# Patient Record
Sex: FEMALE | Race: OTHER | Employment: OTHER | URBAN - METROPOLITAN AREA
[De-identification: names, ages, dates, MRNs, and addresses within clinical notes are randomized per-mention and may not be internally consistent; named-entity substitution may affect disease eponyms.]

---

## 2017-02-21 ENCOUNTER — HOSPITAL ENCOUNTER (OUTPATIENT)
Dept: PHYSICAL THERAPY | Age: 62
Discharge: HOME | End: 2017-02-21
Payer: MEDICARE

## 2017-02-21 PROCEDURE — 97161 PT EVAL LOW COMPLEX 20 MIN: CPT

## 2017-02-21 PROCEDURE — G8985 CARRY GOAL STATUS: HCPCS

## 2017-02-21 PROCEDURE — G8984 CARRY CURRENT STATUS: HCPCS

## 2017-02-21 PROCEDURE — 97110 THERAPEUTIC EXERCISES: CPT

## 2017-02-21 NOTE — PROGRESS NOTES
PHYSICAL THERAPY - DAILY TREATMENT NOTE    Patient Name: Nadeem Noguera        Date: 2017  : 1955    Patient  Verified: yes  Visit #:   1     Insurance: Payor: BLUE CROSS MEDICARE / Plan: [facility] / Product Type: Managed Care Medicare /      In time: 1000 Out time: 1030   Total Treatment Time: 30     Medicare Time Tracking (below)   Total Timed Codes (min):  10 1:1 Treatment Time:  30     TREATMENT AREA =  Bursitis of right shoulder [M75.51]    SUBJECTIVE  Pain Level (on 0 to 10 scale):  7  / 10   Medication Changes/New allergies or changes in medical history, any new surgeries or procedures?     NO    If yes, update Summary List   Subjective Functional Status/Changes:  []  No changes reported     See EVAL/ POC         OBJECTIVE  Modalities Rationale: to decrease pain, edema, neural compromise in order to perform ADLs/ rest with improved ease        min [] Estim, type/location:                                      []  att     []  unatt     []  w/US     []  w/ice    []  w/heat    min []  Mechanical Traction: type/lbs                   []  pro   []  sup   []  int   []  cont    []  before manual    []  after manual    min []  Ultrasound, settings/location:      min []  Iontophoresis w/ dexamethasone, location:                                               []  take home patch       []  in clinic    min []  Ice     []  Heat    location/position:     min []  Vasopneumatic Device, press/temp:     min []  Other:    [] Skin assessment post-treatment (if applicable):    []  intact    []  redness- no adverse reaction     []redness  adverse reaction:        01 min Therapeutic Exercise:  [x]  See flow sheet   Rationale:      increase ROM and increase strength to improve the patients ability to perform ADLs      min Manual Therapy:    Rationale:      decrease pain, increase ROM, increase tissue extensibility and decrease trigger points to improve patient's ability to perform ADLs     min Neuromuscular Re-ed:    Rationale:    improve coordination, improve balance, increase proprioception and improved posture, improve motor control to improve the patients ability to perform ADLs     min Gait Training:    Rationale:      x min Patient Education:  YES  Reviewed HEP   []  Progressed/Changed HEP based on: Other Objective/Functional Measures:    See EVAL/ POC     Post Treatment Pain Level (on 0 to 10) scale:   <7  / 10     ASSESSMENT      [x]  See POC     PLAN  [x]  Upgrade activities as tolerated  Continue plan of care: yes   []  Discharge due to :    []  Other:      Therapist: Carole Ahumada, PT    Date: 2/21/2017 Time: 10:34 AM     No future appointments.

## 2017-02-21 NOTE — PROGRESS NOTES
[facility] PHYSICAL THERAPY  [employer] [address], [address], [facility], [address] [place] [address] Street - Phone: [phone]  Fax: [phone] / [address] [facility]  Patient Name: Roxanna Noguera : 1955   Medical   Diagnosis: R shoulder bursitis Treatment Diagnosis: Bursitis of right shoulder [M75.51]   Onset Date: 6 month Hx     Referral Source: Jett Berry MD Start of Care [facility]): 2017   Prior Hospitalization: See medical history Provider #: [id number]   Prior Level of Function: Progressive worsening of pain   Comorbidities: arthritis   Medications: Verified on Patient Summary List   The Plan of Care and following information is based on the information from the initial evaluation.   ===================================================================  Assessment / knowles information:  Ms. Dulce Maria Degroot is a 63 yo female who reports 6 month history of right shoulder pain and dysfunction. She does not recall precipitating event, but reports history of pain from arthritis. R shoulder pain is made worse with reaching, carrying, and use. She denies pain at rest.  On assessment, Ms. Noguera sits with forward head and shoulders. Shoulder AROM (R?L): flex= 95/ 144, scap= 107/ 118, ER= 90/90, IR= 70/ 90 degrees. She has limited mobility and pain with combined movements of med rot/ ext, lat rot/ flex. PROM is significantly better than active. She is weak and painful with resistance to right shoulder flex, scaption and ER.   FOTO score= 27%  ===================================================================  Eval Complexity: History LOW Complexity : Zero comorbidities / personal factors that will impact the outcome / POC;  Examination  LOW Complexity : 1-2 Standardized tests and measures addressing body structure, function, activity limitation and / or participation in recreation ; Presentation MEDIUM Complexity : Evolving with changing characteristics ; Decision Making MEDIUM Complexity : FOTO score of 26-74;   Overall Complexity LOW   Problem List: pain affecting function, decrease ROM, decrease strength, decrease ADL/ functional abilitiies, decrease activity tolerance and decrease flexibility/ joint mobility   Treatment Plan may include any combination of the following: Therapeutic exercise, Therapeutic activities, Neuromuscular re-education, Physical agent/modality, Manual therapy and Patient education  Patient / Family readiness to learn indicated by: asking questions, trying to perform skills and interest  Persons(s) to be included in education: patient (P)  Barriers to Learning/Limitations: None  Measures taken:    Patient Goal (s): Decrease pain   Patient self reported health status: good  Rehabilitation Potential: good   Short Term Goals: To be accomplished in  1-2  weeks:  1. Increase R shoulder AROM flex > 110 for improved reaching overhead. 2.Educate patient on early level ROM and stability exercise for HEP; patient to report compliance.  Long Term Goals: To be accomplished in  4-6  weeks:  1. Patient to report >= 60% improvement in symptoms with use of r UE. 2. Increase AROM >= 75% WNL for improved ability to reach/ dress/ groom hair. 3. Patient independent with finalized HEP/ self maintenance. 4. Increase FOTO score >= 60% to indicate improved function with use of R UE  Frequency / Duration:   Patient to be seen  2-3  times per week for 4-6  weeks:  Patient / Caregiver education and instruction: self care, activity modification and exercises  G-Codes (GP): Carry: [id number] Current  CL= 60-79%    Goal  CJ= 20-39%.   The severity rating is based on the FOTO Score      Therapist Signature: Remington Scherer, PT Date: 1/60/6729   Certification Period: 2/21/17 to 5/18/17 Time: 10:35 AM   ===========================================================================================  I certify that the above Physical Therapy Services are being furnished while the patient is under my care. I agree with the treatment plan and certify that this therapy is necessary. Physician Signature:        Date:       Time:     Please sign and return to [facility] or you may fax the signed copy to [phone]. Thank you.

## 2017-02-23 ENCOUNTER — HOSPITAL ENCOUNTER (OUTPATIENT)
Dept: PHYSICAL THERAPY | Age: 62
Discharge: HOME | End: 2017-02-23
Payer: MEDICARE

## 2017-02-23 PROCEDURE — 97140 MANUAL THERAPY 1/> REGIONS: CPT

## 2017-02-23 PROCEDURE — 97110 THERAPEUTIC EXERCISES: CPT

## 2017-02-23 NOTE — PROGRESS NOTES
PHYSICAL THERAPY - DAILY TREATMENT NOTE    Patient Name: Jay Noguera        Date: 2017  : 1955   YES Patient  Verified  Visit #:     Insurance: Payor: BLUE CROSS MEDICARE / Plan: [facility] / Product Type: Managed Care Medicare /      In time: 200 Out time: 245   Total Treatment Time: 45     Medicare Time Tracking (below)   Total Timed Codes (min):  45 1:1 Treatment Time:  45     TREATMENT AREA =  Bursitis of right shoulder [M75.51]    SUBJECTIVE  Pain Level (on 0 to 10 scale):  6  / 10   Medication Changes/New allergies or changes in medical history, any new surgeries or procedures? NO    If yes, update Summary List   Subjective Functional Status/Changes:  []  No changes reported     i was not able to do the exercises because i had the flu for 4 days and was very sick. All the muscle on the back of my shoulder are very painful and sore. OBJECTIVE      30 min Therapeutic Exercise:  [x]  See flow sheet   Rationale:      increase ROM and increase strength to improve the patients ability to perform general ADLs with decrease c/o symptoms. 15 min Manual Therapy: STM posterior (R) shoulder. PROM. Rhythmic stabs. Rationale:      decrease pain, increase ROM and increase tissue extensibility to improve patient's ability to perform general ADLs with decrease c/o symptoms. min Patient Education:  YES  Reviewed HEP   []  Progressed/Changed HEP based on: Other Objective/Functional Measures:    No change in functional measurements today     Post Treatment Pain Level (on 0 to 10) scale:   2  / 10     ASSESSMENT  Assessment/Changes in Function:     Overall good tolerance to all therapeutic interventions for first treatment session    Reports significant improvement of shoulder pain and less tension of her shoulder.      []  See Progress Note/Recertification   Patient will continue to benefit from skilled PT services to modify and progress therapeutic interventions, address ROM deficits, address strength deficits, analyze and address soft tissue restrictions and analyze and cue movement patterns to attain remaining goals. Progress toward goals / Updated goals:    No change toward goals today.      PLAN  []  Upgrade activities as tolerated YES Continue plan of care   []  Discharge due to :    []  Other:      Therapist: DEEPTHI Sheppard    Date: 2/23/2017 Time: 8:46 AM       Future Appointments  Date Time Provider Lena Ashford   2/23/2017 2:00 PM Adriel Rodriguez PTA [facility]

## 2017-03-01 ENCOUNTER — HOSPITAL ENCOUNTER (OUTPATIENT)
Dept: PHYSICAL THERAPY | Age: 62
Discharge: HOME | End: 2017-03-01
Payer: MEDICARE

## 2017-03-01 PROCEDURE — 97140 MANUAL THERAPY 1/> REGIONS: CPT

## 2017-03-01 PROCEDURE — 97110 THERAPEUTIC EXERCISES: CPT

## 2017-03-01 NOTE — PROGRESS NOTES
PHYSICAL THERAPY - DAILY TREATMENT NOTE    Patient Name: Roxanna Noguera        Date: 3/1/2017  : 1955   YES Patient  Verified  Visit #:   3   of   12  Insurance: Payor: BLUE CROSS MEDICARE / Plan: [facility] / Product Type: Managed Care Medicare /      In time: 315 Out time: 350   Total Treatment Time: 35     Medicare Time Tracking (below)   Total Timed Codes (min):  35 1:1 Treatment Time:  25     TREATMENT AREA =  Right shoulder pain [M25.511]  Bursitis of right shoulder [M75.51]    SUBJECTIVE  Pain Level (on 0 to 10 scale):  4  / 10   Medication Changes/New allergies or changes in medical history, any new surgeries or procedures? NO    If yes, update Summary List   Subjective Functional Status/Changes:  []  No changes reported     Reports shoulder gets very sore if she lifts anything. Slight improvement in pain since last visit. OBJECTIVE    25 (15)  min Therapeutic Exercise:  [x]  See flow sheet   Rationale:      increase ROM, increase strength and improve coordination to improve the patients ability to perform pain free ADLs. 10 Min Manual Therapy: (R) shoulder PROM. TPR (R) periscapular mms. Rationale:      decrease pain, increase ROM, increase tissue extensibility and decrease trigger points to improve patient's ability to perform pain free ADLs. min Patient Education:  YES  Reviewed HEP   []  Progressed/Changed HEP based on: Other Objective/Functional Measures: Therex per flow sheet. Post Treatment Pain Level (on 0 to 10) scale:   1  / 10     ASSESSMENT  Assessment/Changes in Function:     TTP along mid delt, infraspinous fossa, post capsule.      []  See Progress Note/Recertification   Patient will continue to benefit from skilled PT services to modify and progress therapeutic interventions, address functional mobility deficits, address ROM deficits, address strength deficits, analyze and address soft tissue restrictions, analyze and cue movement patterns, analyze and modify body mechanics/ergonomics and assess and modify postural abnormalities to attain remaining goals. Progress toward goals / Updated goals:    Decrease in reported pain levels. Progressing toward LTG #1.       PLAN  [x]  Upgrade activities as tolerated YES Continue plan of care   []  Discharge due to :    []  Other:      Therapist: Mercy Granado PTA    Date: 3/1/2017 Time: 5:35 PM     Future Appointments  Date Time Provider Lena Ashford   3/6/2017 4:30 PM Mara Perez, PT [facility]   3/8/2017 3:00 PM Jerod White, PT [facility]   3/14/2017 2:00 PM Jeffrey Ruano, PT [facility]   3/17/2017 2:30 PM Jeffrey Ruano, PT [facility]   3/21/2017 2:30 PM Jeffrey Ruano, PT [facility]   3/23/2017 2:30 PM Jeffrey Ruano, PT [facility]

## 2017-03-06 ENCOUNTER — HOSPITAL ENCOUNTER (OUTPATIENT)
Dept: PHYSICAL THERAPY | Age: 62
Discharge: HOME | End: 2017-03-06
Payer: MEDICARE

## 2017-03-06 PROCEDURE — 97140 MANUAL THERAPY 1/> REGIONS: CPT

## 2017-03-06 PROCEDURE — 97110 THERAPEUTIC EXERCISES: CPT

## 2017-03-06 NOTE — PROGRESS NOTES
PHYSICAL THERAPY - DAILY TREATMENT NOTE    Patient Name: Kelsea Noguera        Date: 3/6/2017  : 1955   YES Patient  Verified  Visit #:     Insurance: Payor: BLUE CROSS MEDICARE / [facility] / Product Type: Managed Care Medicare /      In time: 4:30 Out time: 5:19   Total Treatment Time: 49     Medicare Time Tracking (below)   Total Timed Codes (min):  49 1:1 Treatment Time:  35     TREATMENT AREA =  Right shoulder pain [M25.511]  Bursitis of right shoulder [M75.51]    SUBJECTIVE  Pain Level (on 0 to 10 scale):   10   Medication Changes/New allergies or changes in medical history, any new surgeries or procedures?     NO    If yes, update Summary List   Subjective Functional Status/Changes:  []  No changes reported     Reports max pain 6/10 recently mostly at left CS/upper trap/levator scap          OBJECTIVE    8 Min Manual Therapy: DTM upper trap, lev scap, lat delt; PROM all planes, GH mobs post, inf   Rationale:      decrease pain, increase ROM and increase tissue extensibility to improve patient's ability to lift/reach    41 min Therapeutic Exercise:  [x]  See flow sheet   Rationale:      increase ROM and increase strength to improve the patients ability to lift/reach      min Patient Education:  YES  Reviewed HEP   []  Progressed/Changed HEP based on:   Given UT and LevScap stretches     Other Objective/Functional Measures:                          AROM                                                           MMT   Right Left  Right Left   Flexion 156  Flexion     Extension 44  Extension     ER @ 0 Degrees 50  ER     Scaption 146  Scaption     IR behind back  (C7 to thumb) 26cm 17cm IR     90/90 ER   I     - added several exs for ROM and scap/RTC stabilization       Post Treatment Pain Level (on 0 to 10) scale:   3  / 10     ASSESSMENT  Assessment/Changes in Function:     Improved AROM shld, improve shld pain, most now in CS     []  See Progress Note/Recertification   Patient will continue to benefit from skilled PT services to modify and progress therapeutic interventions, address functional mobility deficits, address ROM deficits, address strength deficits, analyze and address soft tissue restrictions and analyze and cue movement patterns to attain remaining goals.    Progress toward goals / Updated goals:    LTG 2 progressing, improved AROM     PLAN  [x]  Upgrade activities as tolerated YES Continue plan of care   []  Discharge due to :    []  Other:      Therapist: Jr Singh PT, DPT, OCS, CSCS    Date: 3/6/2017 Time: 4:55 PM     Future Appointments  Date Time Provider Lena Ashford   3/8/2017 3:00 PM Asmita Byers PT [address]   3/14/2017 2:00 PM Barbi Palacio PT [address]   3/17/2017 2:30 PM Barbi Palacio PT [address]   3/21/2017 2:30 PM Barbi Palacio PT [address]   3/23/2017 2:30 PM Barbi Palacio PT [address]

## 2017-03-14 ENCOUNTER — APPOINTMENT (OUTPATIENT)
Dept: PHYSICAL THERAPY | Age: 62
End: 2017-03-14
Payer: MEDICARE

## 2017-03-17 ENCOUNTER — APPOINTMENT (OUTPATIENT)
Dept: PHYSICAL THERAPY | Age: 62
End: 2017-03-17
Payer: MEDICARE

## 2017-03-20 ENCOUNTER — HOSPITAL ENCOUNTER (OUTPATIENT)
Dept: PHYSICAL THERAPY | Age: 62
Discharge: HOME | End: 2017-03-20
Payer: MEDICARE

## 2017-03-20 PROCEDURE — G8985 CARRY GOAL STATUS: HCPCS

## 2017-03-20 PROCEDURE — 97140 MANUAL THERAPY 1/> REGIONS: CPT

## 2017-03-20 PROCEDURE — G8984 CARRY CURRENT STATUS: HCPCS

## 2017-03-20 PROCEDURE — 97110 THERAPEUTIC EXERCISES: CPT

## 2017-03-20 NOTE — PROGRESS NOTES
[address] [facility] PHYSICAL THERAPY  [address], [address], [place], [address] - Phone: [phone]  Fax: [phone]  PROGRESS NOTE  Patient Name: Bryan Noguera : 1955   Treatment/Medical Diagnosis: Right shoulder pain [M25.511]  Bursitis of right shoulder [M75.51]   Referral Source: Malina Conrad MD     Date of Initial Visit: 17 Attended Visits: 5 Missed Visits: 1     SUMMARY OF TREATMENT  Pt has attended 5 sessions of PT w/ tx consisting of therex and manual to improve R shoulder ROM, c/s flexibility/mobility, and posture mechanics, and pt education including HEP instruction. CURRENT STATUS  Pt demo's slow progress towards goals but does report subjective improvement in sx, noting inc ease w/ ADLs. Pt reports max pain 8/10 that is worse when laying on R side. Pt does cont to c/o pain/tension in the R cervical/upper trap region. PROM of R shoulder WNL and pain-free in all planes. R shoulder AROM: flex 156, abd 146, ext 44, ER (in neutral)  50, FIR 26 cm from C7. FOTO 56/100, GROC +5 \"a good deal better\"    Goal/Measure of Progress Goal Met? 1.  Pt to report >/= 60% improvement in sx w/ use of R UE   Status at last Eval: 10/10 Current Status: 8/10 progress   2. Inc AROM >/= 75% WNL for improved ability to reach, dress, groom   Status at last Eval: Flex 95, scap 107, ER 90, IR 70 Current Status: Flex 156, scap 146, ext 44, FIR 26 cm from C7 progress   3. Inc FOTO score to >/= 60% to indicate improved function w/ use of R UE   Status at last Eval: 27% Current Status: 56% progress     New Goals to be achieved in __3__  weeks:  1. Achieve LTG #1  2. Pt will demo I w/ long-term HEP and be prepared for DC  3. Achieve LTG #3    RECOMMENDATIONS  Recommend pt continue PT 2x/wk for additional 4 weeks to further improve upon R shoulder AROM and strength/stability to improve pt's ability to complete ADLs.     If you have any questions/comments please contact us directly at [phone]. Thank you for allowing us to assist in the care of your patient. Therapist Signature: Pennie Adkins PT Date: 3/20/2017     Time: 2:23 PM   NOTE TO PHYSICIAN:  PLEASE COMPLETE THE ORDERS BELOW AND FAX TO   [facility]: [phone]  If you are unable to process this request in 24 hours please contact our office: [phone]    ___ I have read the above report and request that my patient continue as recommended.   ___ I have read the above report and request that my patient continue therapy with the following changes/special instructions:_________________________________________________________   ___ I have read the above report and request that my patient be discharged from therapy.      Physician Signature:        Date:       Time:

## 2017-03-20 NOTE — PROGRESS NOTES
PHYSICAL THERAPY - DAILY TREATMENT NOTE    Patient Name: Eloisa Noguera        Date: 3/20/2017  : 1955   yes Patient  Verified  Visit #:      of     Insurance: Payor: BLUE CROSS MEDICARE / [facility] / Product Type: Managed Care Medicare /      In time: 2:28 Out time: 3:10   Total Treatment Time: 42     Medicare Time Tracking (below)   Total Timed Codes (min):  42 1:1 Treatment Time:  39     TREATMENT AREA =  Right shoulder pain [M25.511]  Bursitis of right shoulder [M75.51]    SUBJECTIVE  Pain Level (on 0 to 10 scale):  5  / 10   Medication Changes/New allergies or changes in medical history, any new surgeries or procedures?    no  If yes, update Summary List   Subjective Functional Status/Changes:  []  No changes reported     Pt c/o pain B shoulder over the weekend \"I think I ma over-using my left one\". Pt reports overall improvement in her R shoulder and inc ease w/ doing dishes. Cont to c/o pain w/ laying on the R side, max pain 8/10. OBJECTIVE    34 min Therapeutic Exercise:  [x]  See flow sheet   Rationale:      increase ROM, increase strength and improve coordination to improve the patients ability to lift, reach, carry     8 min Manual Therapy: STM to R lev scap, UT, pec minor; PROM all planes, inf GHJ mob   Rationale:      decrease pain, increase ROM, increase tissue extensibility and decrease trigger points to improve patient's ability to lift, reach     min Patient Education:  yes  Reviewed HEP   []  Progressed/Changed HEP based on:        Other Objective/Functional Measures:    31' 1:1 TE    PROM R shoulder WNL and pain-free all planes  Noted difficulty wand IR AAROM and c/o soreness anterior shoulder  FOTO: 56  GROC: +5     Post Treatment Pain Level (on 0 to 10) scale:   4  / 10     ASSESSMENT  Assessment/Changes in Function:     See PN     []  See Progress Note/Recertification   Patient will continue to benefit from skilled PT services to modify and progress therapeutic interventions, address functional mobility deficits, address ROM deficits, address strength deficits, analyze and address soft tissue restrictions, analyze and cue movement patterns, analyze and modify body mechanics/ergonomics and instruct in home and community integration to attain remaining goals.    Progress toward goals / Updated goals:    See PN     PLAN  []  Upgrade activities as tolerated yes Continue plan of care   []  Discharge due to :    []  Other:      Therapist: Josselin Solares PT    Date: 3/20/2017 Time: 2:20 PM     Future Appointments  Date Time Provider Lena Ashford   3/20/2017 2:30 PM Josselin Solares PT [facility]   3/21/2017 2:30 PM Beryle Dys, PT [facility]   3/23/2017 2:30 PM Beryle Dys, PT [facility]

## 2017-03-21 ENCOUNTER — HOSPITAL ENCOUNTER (OUTPATIENT)
Dept: PHYSICAL THERAPY | Age: 62
Discharge: HOME | End: 2017-03-21
Payer: MEDICARE

## 2017-03-21 PROCEDURE — 97110 THERAPEUTIC EXERCISES: CPT

## 2017-03-21 PROCEDURE — 97140 MANUAL THERAPY 1/> REGIONS: CPT

## 2017-03-21 NOTE — PROGRESS NOTES
PHYSICAL THERAPY - DAILY TREATMENT NOTE    Patient Name: Leonel Noguera        Date: 3/21/2017  : 1955   YES Patient  Verified  Visit #:     Insurance: Payor: BLUE CROSS MEDICARE / [facility] / Product Type: Managed Care Medicare /      In time: 235 Out time: 320   Total Treatment Time: 45     Medicare Time Tracking (below)   Total Timed Codes (min):  45 1:1 Treatment Time:  45     TREATMENT AREA = Right shoulder pain [M25.511]  Bursitis of right shoulder [M75.51]    SUBJECTIVE  Pain Level (on 0 to 10 scale):  4  / 10   Medication Changes/New allergies or changes in medical history, any new surgeries or procedures? NO    If yes, update Summary List   Subjective Functional Status/Changes:  []  No changes reported     Reports soreness in (R) shld pain with combined extension and IR when reaching behind back           OBJECTIVE    35 min Therapeutic Exercise:  [x]  See flow sheet   Rationale:      increase ROM and increase strength to improve the patients ability to perform ADLs     10 Min Manual Therapy: DTM to pec minor, UT/LS, caudal glide of scap, posterior tilt inferior glide of scap, MET for posterior ribs 3-5   Rationale:      decrease pain, increase ROM and increase tissue extensibility to improve patient's ability to perform ADLs     min Patient Education:  YES  Reviewed HEP   []  Progressed/Changed HEP based on:         Other Objective/Functional Measures:    Demonstrates increase in pec minor, UT/LS hypertonicity with pain with combined extension/internal rotation      Post Treatment Pain Level (on 0 to 10) scale:   0  / 10     ASSESSMENT  Assessment/Changes in Function:     Tolerated therx today with ability to perform AAROM of combined IR/ext to T7 at end of treatment session with tactile cues for posture and scap positioning     []  See Progress Note/Recertification   Patient will continue to benefit from skilled PT services to modify and progress therapeutic interventions, address functional mobility deficits, address ROM deficits, address strength deficits and analyze and address soft tissue restrictions to attain remaining goals. Progress toward goals / Updated goals:    No changes towards goals this visit.  Will monitor and progress as able      PLAN  []  Upgrade activities as tolerated YES Continue plan of care   []  Discharge due to :    []  Other:      Therapist: Juan Pablo Toscano DPT, CIMT    Date: 3/21/2017 Time: 3:28 PM       Future Appointments  Date Time Provider Lena Ashford   3/23/2017 2:30 PM Ang Belcher, PT [facility]

## 2017-03-23 ENCOUNTER — APPOINTMENT (OUTPATIENT)
Dept: PHYSICAL THERAPY | Age: 62
End: 2017-03-23
Payer: MEDICARE

## 2017-03-24 ENCOUNTER — HOSPITAL ENCOUNTER (OUTPATIENT)
Dept: PHYSICAL THERAPY | Age: 62
Discharge: HOME | End: 2017-03-24
Payer: MEDICARE

## 2017-03-24 PROCEDURE — 97110 THERAPEUTIC EXERCISES: CPT

## 2017-03-24 PROCEDURE — 97140 MANUAL THERAPY 1/> REGIONS: CPT

## 2017-03-24 NOTE — PROGRESS NOTES
PHYSICAL THERAPY - DAILY TREATMENT NOTE    Patient Name: Panda Noguera        Date: 3/24/2017  : 1955   yes Patient  Verified  Visit #:     Insurance: Payor: BLUE CROSS MEDICARE / [facility] / Product Type: Managed Care Medicare /      In time: 1:24 Out time: 2:02   Total Treatment Time: 38     Medicare Time Tracking (below)   Total Timed Codes (min):  38 1:1 Treatment Time:  38     TREATMENT AREA =  Right shoulder pain [M25.511]  Bursitis of right shoulder [M75.51]    SUBJECTIVE  Pain Level (on 0 to 10 scale):  6  / 10   Medication Changes/New allergies or changes in medical history, any new surgeries or procedures?    no  If yes, update Summary List   Subjective Functional Status/Changes:  []  No changes reported     Pt reports inc pain along the R posterior c/s and shoulder blade. OBJECTIVE  28 min Therapeutic Exercise:  [x]  See flow sheet   Rationale:      increase ROM, increase strength and improve coordination to improve the patients ability to complete reaching tasks, dressing     10 min Manual Therapy: STM/DTM to R rhomboid, infraspinatus, lev scap, UT, c/s paraspinal; scapular depression, post tilt mobl inf, post GHJ mob   Rationale:      decrease pain, increase ROM, increase tissue extensibility and decrease trigger points to improve patient's ability to complete ADLs     min Patient Education:  yes  Reviewed HEP   []  Progressed/Changed HEP based on: Other Objective/Functional Measures:     Added wall ladder flexion to improve AROM R shoulder  Noted soft tissue restrictions R periscapular musculature limiting scap mobility       Post Treatment Pain Level (on 0 to 10) scale:    10     ASSESSMENT  Assessment/Changes in Function:     Pt will cont to benefit from PT to progress PREs in order to improve R shoulder strength/stability for ADLs     []  See Progress Note/Recertification   Patient will continue to benefit from skilled PT services to modify and progress therapeutic interventions, address functional mobility deficits, address ROM deficits, address strength deficits, analyze and address soft tissue restrictions, analyze and cue movement patterns, analyze and modify body mechanics/ergonomics, assess and modify postural abnormalities and instruct in home and community integration to attain remaining goals. Progress toward goals / Updated goals:    Pt progressing to LTG #1     PLAN  []  Upgrade activities as tolerated yes Continue plan of care   []  Discharge due to :    []  Other:      Therapist: Elisabet Rodgers PT    Date: 3/24/2017 Time: 1:47 PM     No future appointments.

## 2017-03-29 ENCOUNTER — HOSPITAL ENCOUNTER (OUTPATIENT)
Dept: PHYSICAL THERAPY | Age: 62
Discharge: HOME | End: 2017-03-29
Payer: MEDICARE

## 2017-03-29 PROCEDURE — 97110 THERAPEUTIC EXERCISES: CPT

## 2017-03-29 PROCEDURE — 97140 MANUAL THERAPY 1/> REGIONS: CPT

## 2017-03-29 NOTE — PROGRESS NOTES
PHYSICAL THERAPY - DAILY TREATMENT NOTE    Patient Name: Shabana Noguera        Date: 3/29/2017  : 1955   yes Patient  Verified  Visit #:     Insurance: Payor: BLUE CROSS MEDICARE / Plan: [facility] / Product Type: Managed Care Medicare /      In time: 3:28 Out time: 4:09   Total Treatment Time: 41     Medicare Time Tracking (below)   Total Timed Codes (min):  41 1:1 Treatment Time:  30     TREATMENT AREA =  Right shoulder pain [M25.511]  Bursitis of right shoulder [M75.51]    SUBJECTIVE  Pain Level (on 0 to 10 scale):  5  / 10   Medication Changes/New allergies or changes in medical history, any new surgeries or procedures?    no  If yes, update Summary List   Subjective Functional Status/Changes:  []  No changes reported     Pt reports inc pain with excessive cooking. Reports she chopped 3 zucchinis into small pieces today and had inc pain R ant shoulder. OBJECTIVE    31 min Therapeutic Exercise:  [x]  See flow sheet   Rationale:      increase ROM and increase strength to improve the patients ability to complete ADLs, reaching     10 min Manual Therapy: STM to R ant delt, pec minor, major, TPR to R UT; RS at 90 deg of flex, post cuff str   Rationale:      decrease pain, increase ROM, increase tissue extensibility and decrease trigger points to improve patient's ability to complete ADLs       min Patient Education:  yes  Reviewed HEP   []  Progressed/Changed HEP based on: Other Objective/Functional Measures:    20' 1:1 TE    Pt c/o pain at end range finger ladder flexion; corrected form due to pt in more abduction vs scaption and pt reported dec pain  Cont w/ soft tissue restrictions to R UT       Post Treatment Pain Level (on 0 to 10) scale:   3  / 10     ASSESSMENT  Assessment/Changes in Function:     Pt reported dec pain post tx session.  Advised pt to refrain from over-activity, specifically with cooking and lifting/carrying to avoid ove use     []  See Progress Note/Recertification   Patient will continue to benefit from skilled PT services to modify and progress therapeutic interventions, address functional mobility deficits, address ROM deficits, address strength deficits, analyze and address soft tissue restrictions, analyze and cue movement patterns, analyze and modify body mechanics/ergonomics, assess and modify postural abnormalities and instruct in home and community integration to attain remaining goals.    Progress toward goals / Updated goals:    No significant progress made towards goals this visit     PLAN  []  Upgrade activities as tolerated yes Continue plan of care   []  Discharge due to :    []  Other:      Therapist: Cathy Ibrahim PT    Date: 3/29/2017 Time: 3:47 PM     Future Appointments  Date Time Provider Lena Ashford   3/30/2017 9:00 AM [facility]

## 2017-03-30 ENCOUNTER — HOSPITAL ENCOUNTER (OUTPATIENT)
Dept: PHYSICAL THERAPY | Age: 62
Discharge: HOME | End: 2017-03-30
Payer: MEDICARE

## 2017-03-30 PROCEDURE — 97110 THERAPEUTIC EXERCISES: CPT

## 2017-03-30 PROCEDURE — 97140 MANUAL THERAPY 1/> REGIONS: CPT

## 2017-03-30 NOTE — PROGRESS NOTES
PHYSICAL THERAPY - DAILY TREATMENT NOTE    Patient Name: Hany Noguera        Date: 3/30/2017  : 1955   YES Patient  Verified  Visit #:     Insurance: Payor: BLUE CROSS MEDICARE / Plan: [facility] / Product Type: Managed Care Medicare /      In time: 900 Out time: 940   Total Treatment Time: 40     Medicare Time Tracking (below)   Total Timed Codes (min):  40 1:1 Treatment Time:  40     TREATMENT AREA = Right shoulder pain [M25.511]  Bursitis of right shoulder [M75.51]    SUBJECTIVE  Pain Level (on 0 to 10 scale):  4  / 10   Medication Changes/New allergies or changes in medical history, any new surgeries or procedures?     NO    If yes, update Summary List   Subjective Functional Status/Changes:  []  No changes reported     Reports cont soreness after therapy session last visit           OBJECTIVE  Modalities Rationale:      to improve patient's ability to    min [] Estim, type/location:                                      []  att     []  unatt     []  w/US     []  w/ice    []  w/heat    min []  Mechanical Traction: type/lbs                   []  pro   []  sup   []  int   []  cont    []  before manual    []  after manual    min []  Ultrasound, settings/location:      min []  Iontophoresis w/ dexamethasone, location:                                               []  take home patch       []  in clinic    min []  Ice     []  Heat    location/position:     min []  Vasopneumatic Device, press/temp:     min []  Other:    [] Skin assessment post-treatment (if applicable):    []  intact    []  redness- no adverse reaction     []redness  adverse reaction:        30 min Therapeutic Exercise:  [x]  See flow sheet   Rationale:      increase ROM and increase strength to improve the patients ability to increase functional use of (R) UE during ADLs     10 min Manual Therapy: DTM to (R) anterior shld region, (R) post cuff, LS, UT, subscap, f/b PROM of (R) shld Rationale:      decrease pain, increase ROM and increase tissue extensibility to improve patient's ability to perform ADLS     min Patient Education:  YES  Reviewed HEP   []  Progressed/Changed HEP based on: Other Objective/Functional Measures:    Demonstrates increase mm hypertonicity in (R) scapular and RTC mm with pain in anterior shld region, con't with poor scapular positioning with anterior tilt and elevated scapula on (R) compared to (L)     Post Treatment Pain Level (on 0 to 10) scale:   2  / 10     ASSESSMENT  Assessment/Changes in Function:     Decrease in pain levels post therx and manual therapy, poor carry over between PT session      []  See Progress Note/Recertification   Patient will continue to benefit from skilled PT services to modify and progress therapeutic interventions, address functional mobility deficits, address ROM deficits, address strength deficits and analyze and address soft tissue restrictions to attain remaining goals.    Progress toward goals / Updated goals:    See MD progress note     PLAN  []  Upgrade activities as tolerated YES Continue plan of care   []  Discharge due to :    []  Other:      Therapist: Eliseo Hemphill DPT, CIMT    Date: 3/30/2017 Time: 9:58 AM       Future Appointments  Date Time Provider Lena Ashford   4/4/2017 9:00 AM James Ba, PT [facility]   4/5/2017 11:00 AM James Ba, PT [facility]   5/3/2017 2:30 PM James Ba, PT [facility]   5/5/2017 2:30 PM Rebbecca Early, PT [facility]   5/9/2017 2:30 PM Rebbecca Early, PT [facility]   5/11/2017 2:30 PM Rebbecca Early, PT [facility]

## 2017-03-30 NOTE — PROGRESS NOTES
Aline [address]  [facility] PHYSICAL THERAPY  [address], [place], [address] - Phone: [phone]  Fax: [phone] OF CARE/RECERTIFICATION FOR PHYSICAL THERAPY          Patient Name: Jeana Noguera : 1955   Medical/Treatment Diagnosis: Right shoulder pain [M25.511]  Bursitis of right shoulder [M75.51]   Onset Date: 6 month hx    Referral Source: Dorothy Snow MD Start of Care [facility]): 17   Prior Hospitalization: See Medical History Provider #: [id number]   Prior Level of Function: Progressive worsening of pain limiting ADL and leisure activities    Comorbidities: OA   Medications: Verified on Patient Summary List   Visits from [facility]: 9 Missed Visits:      Goal/Measure of Progress Goal Met? 1.  Pt to report >/= 60% improvement in sx w/ use of R UE   Status at last Eval: 8/10 Current Status: 2-6/10  progressing   2. Pt will demo I w/ long-term HEP and be prepared for DC   Status at last Eval: unable Current Status: progressing progressing   3. Inc FOTO score to >/= 60% to indicate improved function w/ use of R UE   Status at last Eval: 56 Current Status: na n/a     Key Functional Changes/Progress: Con't with anterior shld pain with ADLS and leisure activities, Recently reports that she experience pain with chopping vegetables.  Demonstrates full PROM of (R) shld region, cont with limited shld AROM and poor scapular positioning for overhead reach and performance of ADLS   Problem List: pain affecting function, decrease ROM, decrease strength, decrease ADL/ functional abilitiies, decrease activity tolerance and decrease flexibility/ joint mobility   Treatment Plan may include any combination of the following: Therapeutic exercise, Therapeutic activities, Neuromuscular re-education, Physical agent/modality, Manual therapy and Patient education  Patient Goal(s) has been updated and includes:      Goals for this certification period include and are to be achieved in   3-4  weeks:  Con't goals above  Frequency / Duration:   Patient to be seen   2-3   times per week for   4    weeks:  G-Codes (GP): Carry [id number] Current  CK= 40-59%    Goal  CJ= 20-39%. The severity rating is based on the FOTO Score    Assessments/Recommendations: Recommend con't PT services 2-3x week for 4 weeks. If you have any questions/comments please contact us directly at [phone]. Thank you for allowing us to assist in the care of your patient. Therapist Signature: Hermelinda Alejandra DPT, CIMT Date: 6/29/5996   Certification Period:  Reporting Period: 2/21/17-5/18/17  3/20/17-3/30/17 Time: 10:01 AM   NOTE TO PHYSICIAN:  PLEASE COMPLETE THE ORDERS BELOW AND FAX TO   [facility]: [phone]  If you are unable to process this request in 24 hours please contact our office: [phone]    ___ I have read the above report and request that my patient continue as recommended.   ___ I have read the above report and request that my patient continue therapy with the following changes/special instructions: ________________________________________________   ___ I have read the above report and request that my patient be discharged from therapy.      Physician Signature:        Date:       Time:

## 2017-04-04 ENCOUNTER — HOSPITAL ENCOUNTER (OUTPATIENT)
Dept: PHYSICAL THERAPY | Age: 62
Discharge: HOME | End: 2017-04-04
Payer: MEDICARE

## 2017-04-04 PROCEDURE — G8985 CARRY GOAL STATUS: HCPCS

## 2017-04-04 PROCEDURE — 97140 MANUAL THERAPY 1/> REGIONS: CPT

## 2017-04-04 PROCEDURE — 97110 THERAPEUTIC EXERCISES: CPT

## 2017-04-04 PROCEDURE — G8986 CARRY D/C STATUS: HCPCS

## 2017-04-04 NOTE — PROGRESS NOTES
PHYSICAL THERAPY - DAILY TREATMENT NOTE    Patient Name: Gillian Noguera        Date: 2017  : 1955   yes Patient  Verified  Visit #:   10   of   12 (+8)  Insurance: Payor: BLUE CROSS MEDICARE / Plan: [facility] / Product Type: Managed Care Medicare /      In time: 8:48 Out time: 9:29   Total Treatment Time: 41     Medicare Time Tracking (below)   Total Timed Codes (min):  41 1:1 Treatment Time:  41     TREATMENT AREA =  Right shoulder pain [M25.511]  Bursitis of right shoulder [M75.51]    SUBJECTIVE  Pain Level (on 0 to 10 scale):  4  / 10   Medication Changes/New allergies or changes in medical history, any new surgeries or procedures?    no  If yes, update Summary List   Subjective Functional Status/Changes:  []  No changes reported     Pt reports she was packing her suitcase on Saturday, having to do a lot of reaching and folding clothes, noticing inc R shoulder and neck soreness on . Reports it is feeling better today. OBJECTIVE    31 min Therapeutic Exercise:  [x]  See flow sheet   Rationale:      increase ROM, increase strength and improve coordination to improve the patients ability to complete ADLs, reaching tasks     10 min Manual Therapy: STM to R ant delt, infraspinatus, rhomboid major, lev scap; PROM L shoulder   Rationale:      decrease pain, increase ROM, increase tissue extensibility and decrease trigger points to improve patient's ability to complete ADLs     min Patient Education:  yes  Reviewed HEP   []  Progressed/Changed HEP based on: Other Objective/Functional Measures:    ttp to R infraspinatus  Inc reps/resistance per flow to improve strength/stability  Achieve AAROM FIR to inf angle of L scap     Post Treatment Pain Level (on 0 to 10) scale:    10     ASSESSMENT  Assessment/Changes in Function:     Pt cont w/ soft tissue restrictions to R post cuff. Denied pain t/o tx session.       []  See Progress Note/Recertification   Patient will continue to benefit from skilled PT services to modify and progress therapeutic interventions, address functional mobility deficits, address ROM deficits, address strength deficits, analyze and address soft tissue restrictions, analyze and cue movement patterns, analyze and modify body mechanics/ergonomics, assess and modify postural abnormalities and instruct in home and community integration to attain remaining goals.    Progress toward goals / Updated goals:    Progressing to  LTG #2, demos good knowledge of exercise     PLAN  []  Upgrade activities as tolerated yes Continue plan of care   []  Discharge due to :    []  Other:      Therapist: Serena Red PT    Date: 4/4/2017 Time: 8:49 AM     Future Appointments  Date Time Provider Lena Ashford   4/4/2017 9:00 AM Serena Red PT [facility]   4/5/2017 11:00 AM Serena Red PT [facility]   5/3/2017 2:30 PM Serena Red PT [facility]   5/5/2017 2:30 PM [address] [place] [address]   5/9/2017 2:30 PM Dipesh Barrientos, PT [facility]   5/11/2017 2:30 PM Dipesh Barrientos, PT [facility]

## 2017-04-05 ENCOUNTER — APPOINTMENT (OUTPATIENT)
Dept: PHYSICAL THERAPY | Age: 62
End: 2017-04-05
Payer: MEDICARE

## 2017-05-05 ENCOUNTER — APPOINTMENT (OUTPATIENT)
Dept: PHYSICAL THERAPY | Age: 62
End: 2017-05-05

## 2017-05-09 ENCOUNTER — APPOINTMENT (OUTPATIENT)
Dept: PHYSICAL THERAPY | Age: 62
End: 2017-05-09

## 2017-05-11 ENCOUNTER — APPOINTMENT (OUTPATIENT)
Dept: PHYSICAL THERAPY | Age: 62
End: 2017-05-11

## 2017-05-16 NOTE — PROGRESS NOTES
[facility] PHYSICAL THERAPY  [address] [place] [address] [address],[place], [address] [facility] - Phone: [phone]  Fax: [phone]  Joseluis Islasestefaniaaiyana Romero          Patient Name: Fabiola Noguera : 1955   Treatment/Medical Diagnosis: Right shoulder pain [M25.511]  Bursitis of right shoulder [M75.51]   Onset Date: 6 months prior to IE    Referral Source: Janessa Cooper MD [facility] of [facility]): 17   Prior Hospitalization: See Medical History Provider #: [id number]   Prior Level of Function: Progressive worsening of pain   Comorbidities: OA   Medications: Verified on Patient Summary List   Visits from [facility]: 10 Missed Visits: 2     Goal/Measure of Progress Goal Met? 1.  Pt to report >/= 60% overall improvement in sx w/ use of R UE   Status at last Eval: 8/10 Current Status: 6/10 progress   2. Pt will demo I w/ long-term HEP in prep for DC   Status at last Eval: HEP established Current Status: Cont to req cueing for exercise, indicating limited carry over of HEP no   3. Inc FOTO score to >/= 60% to indicate improved function w/ use of R UE   Status at last Eval: 27% Current Status: 56% progress     Key Functional Changes/Progress: Pt attended PT 2x/wk for a total of 10 visits. Pt continued to c/o inc R shoulder and neck p! W/ over activity, including reaching and lifting. Pt demo'd soft tissue restriction to  R UT and posterior cuff. R shoulder AROM flex 156, abd 146, ext 44, ER 50, FIR to inf angle of L scapula. PROM R shoulder WNL all planes. Pt's FOTO score improved significantly. Pt went on extended trip out of town and has not been seen in our facility since 17. For this reason, pt has been discharged from our care. Thank you. G-Codes (GP): Carry  [id number] Goal  CJ= 20-39%  [id number] D/C  CK= 40-59%. The severity rating is based on the FOTO Score  Assessments/Recommendations: Discontinue therapy due to lack of attendance or compliance.     If you have any questions/comments please contact us directly at [phone]. Thank you for allowing us to assist in the care of your patient.     Therapist Signature: [address], PT Date: 5/16/17   Reporting Period: 2/21/17 - 4/4/17 Time: 7:05 AM

## 2017-08-01 ENCOUNTER — HOSPITAL ENCOUNTER (OUTPATIENT)
Dept: PHYSICAL THERAPY | Age: 62
Discharge: HOME | End: 2017-08-01
Payer: MEDICARE

## 2017-08-01 PROCEDURE — G8984 CARRY CURRENT STATUS: HCPCS

## 2017-08-01 PROCEDURE — 97162 PT EVAL MOD COMPLEX 30 MIN: CPT

## 2017-08-01 PROCEDURE — 97110 THERAPEUTIC EXERCISES: CPT

## 2017-08-01 PROCEDURE — G8985 CARRY GOAL STATUS: HCPCS

## 2017-08-01 NOTE — PROGRESS NOTES
[address] PHYSICAL THERAPY  [address] [place] [address] [address],[facility], [address] [facility] - Phone: [phone]  Fax: [phone] / [address] [facility]  Patient Name: Brian Noguera : 1955   Treatment   Diagnosis: (R) shld pain, Cervical pain Medical   Diagnosis: Bursitis of right shoulder [M75.51]   Onset Date: chronic     Referral Source: Maria Fernanda Rosado DO Start of Care [facility]): 2017   Prior Hospitalization: See medical history Provider #: [id number]   Prior Level of Function: Limited with positional tolerance, lifting carrying items due to chronic shld pain and cervical pain   Comorbidities: OA   Medications: Verified on Patient Summary List   The Plan of Care and following information is based on the information from the initial evaluation.   ==================================================================================  Assessment / key information:  Pt is a 61 yo female with recurrent episode of (R) shld pain and cervical pain associated with cervical spondylosis and (R) shld bursitis . She presents with pain ranging from 6-9/10, located (R) shld, cervical spine. Pain is made worse with activity, reaching performing ADLs and leisure activities, better with rest, ADLs and leisure activities. C/S AROM: flexion 40 deg,, extension 40 deg, LSB 15 deg, RSB 15 deg, (R) shld flexion 145 deg, ext: 40 deg, ER: 80 deg, IR, L5  Palpation reveals TTP increase hypertonicity to (R) UT/LS, pec minor, infraspinatus, teres minor . Posture increased scapular protraction and elevation (R) > (L) with increase in thoracic kyphosis . Decrease posterior/infeior glide of GH joint, decreased PA glide to T4-T6. Eura Kylee Sensation is intact to light touch. (-) nerve tension tests. Pt will benefit from PT interventions to address the aforementioned deficits and allow pt to return to PLOF.   Eval Complexity: History MEDIUM  Complexity : 1-2 comorbidities / personal factors will impact the outcome/ POC ;  Examination  MEDIUM Complexity : 3 Standardized tests and measures addressing body structure, function, activity limitation and / or participation in recreation ; Presentation MEDIUM Complexity : Evolving with changing characteristics ; Decision Making MEDIUM Complexity : FOTO score of 26-74; Overall Complexity MEDIUM    ==================================================================================  Problem List: pain affecting function, decrease ROM, decrease strength, decrease ADL/ functional abilitiies, decrease activity tolerance, decrease flexibility/ joint mobility and decrease transfer abilities   Treatment Plan may include any combination of the following: Therapeutic exercise, Therapeutic activities, Neuromuscular re-education, Physical agent/modality, Manual therapy and Patient education  Patient / Family readiness to learn indicated by: asking questions, trying to perform skills and interest  Persons(s) to be included in education: patient (P)  Barriers to Learning/Limitations: no  Measures taken:    Patient Goal (s): Decrease pain   Patient self reported health status: good  Rehabilitation Potential: good   Short Term Goals: To be accomplished in  2-3  weeks:  1. Pt will be independent and compliant with HEP to decrease pain, increase ROM and return pt to PLOF. 2. Pt will note pain less than or equal to 5/10 at worst to allow increase in functional abilities. 3. Pt will demonstrates increase in (R) shld AROM flexion to 160 deg to aid in performing ADLs    Long Term Goals: To be accomplished in  4-6  weeks:  1. Increase score on FOTO by > or = 21 points to demo increase in functional activities. 2. Pt will have full, pain-free AROM of the cervical spine in all planes to increase safety with driving, ADLs and self-care. 3. Pt will note < or = 2/10 pain with all mobility to improve comfort with ADLs.    Frequency / Duration:   Patient to be seen  2-3  times per week for 4-6  weeks:  Patient / Caregiver education and instruction: self care, activity modification and exercises  G-Codes (GP): Carry [id number] Current  CK= 40-59%    Goal  CJ= 20-39%. The severity rating is based on the FOTO Score    Therapist Signature: Arvin CORONAT, CIMT Date: 1/3/3301   Certification Period: 8/1/17-10/24/17 Time: 3:54 PM   ===========================================================================================  I certify that the above Physical Therapy Services are being furnished while the patient is under my care. I agree with the treatment plan and certify that this therapy is necessary. Physician Signature:        Date:       Time:     Please sign and return to [facility] or you may fax the signed copy to [phone]. Thank you.

## 2017-08-01 NOTE — PROGRESS NOTES
PHYSICAL THERAPY - DAILY TREATMENT NOTE    Patient Name: Natalya Noguera        Date: 2017  : 1955   YES Patient  Verified  Visit #:     Insurance: Payor: BLUE CROSS MEDICARE / Plan: [facility] / Product Type: Managed Care Medicare /      In time: 330 Out time: 400   Total Treatment Time: 30     Medicare Time Tracking (below)   Total Timed Codes (min):  30 1:1 Treatment Time:  30     TREATMENT AREA =  (R) shld pain, Cervical pain    SUBJECTIVE    Pain Level (on 0 to 10 scale):  ie  / 10   Medication Changes/New allergies or changes in medical history, any new surgeries or procedures? NO    If yes, update Summary List   Subjective Functional Status/Changes:  []  No changes reported     See POC          OBJECTIVE    8 min Patient Education:  YES  Reviewed HEP   []  Progressed/Changed HEP based on:   Reviewed therx per flow sheet, (R) shld mobility exercises     Other Objective/Functional Measures:    Shown and Performed HEP.      Post Treatment Pain Level (on 0 to 10) scale:   ie  / 10     ASSESSMENT    []  See Progress Note/Recertification   Patient will continue to benefit from skilled therapy to address remaining functional deficits: See POC   Progress toward goals / Updated goals:    See POC     PLAN    []  Upgrade activities as tolerated YES Continue plan of care   []  Discharge due to :    []  Other:      Therapist: Nevaeh Valdez DPT, CIMT    Date: 2017 Time: 3:52 PM

## 2017-08-04 ENCOUNTER — HOSPITAL ENCOUNTER (OUTPATIENT)
Dept: PHYSICAL THERAPY | Age: 62
Discharge: HOME | End: 2017-08-04
Payer: MEDICARE

## 2017-08-04 PROCEDURE — 97110 THERAPEUTIC EXERCISES: CPT

## 2017-08-04 PROCEDURE — 97140 MANUAL THERAPY 1/> REGIONS: CPT

## 2017-08-04 NOTE — PROGRESS NOTES
PHYSICAL THERAPY - DAILY TREATMENT NOTE    Patient Name: Laxmi Noguera        Date: 2017  : 1955   YES Patient  Verified  Visit #:     Insurance: Payor: BLUE CROSS MEDICARE / Plan: [facility] / Product Type: Managed Care Medicare /      In time: 366 Out time: 910   Total Treatment Time: 35     Medicare Time Tracking (below)   Total Timed Codes (min):  35 1:1 Treatment Time:  35     TREATMENT AREA = Right shoulder pain [M25.511]    SUBJECTIVE  Pain Level (on 0 to 10 scale):  6  / 10   Medication Changes/New allergies or changes in medical history, any new surgeries or procedures? NO    If yes, update Summary List   Subjective Functional Status/Changes:  []  No changes reported     Reports con't soreness in (R) shld region at onset of visit, reports difficulty reach behind back at onset of visit           OBJECTIVE        25 min Therapeutic Exercise:  [x]  See flow sheet   Rationale:      increase ROM and increase strength to improve the patients ability to perform ADLs     10 min Manual Therapy: DTM to UT/LS. pec minor, teres major, F/b scap mobs for depression/adduction, downward rotation, thoracic mobs to T4-T6 for extension   Rationale:      decrease pain, increase ROM and increase tissue extensibility to improve patient's ability to increase functional use of (R) UE     min Patient Education:  YES  Reviewed HEP   []  Progressed/Changed HEP based on:         Other Objective/Functional Measures:    Demonstrates increased hypertonicity to (R) UT/LS, pec minor, teres major, decreased scapular downward rotation and adductor noted with reaching behind back       Initiated therx per flow sheet      Post Treatment Pain Level (on 0 to 10) scale:   4  / 10     ASSESSMENT  Assessment/Changes in Function:     Tolerated therx well without increase in pain levels noted improvement in ability to reach behind back at conclusion of PT services      []  See Progress Note/Recertification   Patient will continue to benefit from skilled PT services to modify and progress therapeutic interventions, address functional mobility deficits, address ROM deficits, address strength deficits and analyze and address soft tissue restrictions to attain remaining goals.    Progress toward goals / Updated goals:    No progress towards goals this visit will monitor and progress as able      PLAN  []  Upgrade activities as tolerated YES Continue plan of care   []  Discharge due to :    []  Other:      Therapist: Kalyani Bolanos DPT, CIMT    Date: 8/4/2017 Time: 10:54 AM       Future Appointments  Date Time Provider Lena Ashford   8/8/2017 2:30 PM Fabián Siddiqui, PT [facility]   8/9/2017 2:30 PM Gerry Forbes, PT [facility]   8/14/2017 5:30 PM Gerry Forbes, PT [facility]   8/16/2017 9:00 AM Gerry Forbes PT [facility]   8/18/2017 2:30 PM Fabián Siddiqui, PT [facility]   8/21/2017 2:30 PM Gerry Forbes, PT [facility]   8/22/2017 2:30 PM Fabián Siddiqui, PT [facility]   8/25/2017 2:30 PM Fabián Siddiqui, PT [facility]   8/28/2017 2:30 PM Gerry Forbes, PT [facility]   8/30/2017 2:30 PM Gerry Forbes, PT [facility]   9/1/2017 2:30 PM Fabián Siddiqui, PT [facility]   9/5/2017 2:30 PM Fabián Siddiqui, PT [facility]   9/6/2017 3:00 PM Gerry Forbes, PT [facility]   9/8/2017 2:30 PM Fabián Siddiqui, PT [facility]   9/11/2017 2:30 PM Gerry Forbes PT [facility]   9/13/2017 3:00 PM Gerry Forbes, PT [facility]   9/15/2017 2:30 PM Fabián Siddiqui, PT [facility]

## 2017-08-08 ENCOUNTER — HOSPITAL ENCOUNTER (OUTPATIENT)
Dept: PHYSICAL THERAPY | Age: 62
Discharge: HOME | End: 2017-08-08
Payer: MEDICARE

## 2017-08-08 PROCEDURE — 97140 MANUAL THERAPY 1/> REGIONS: CPT

## 2017-08-08 PROCEDURE — 97110 THERAPEUTIC EXERCISES: CPT

## 2017-08-08 NOTE — PROGRESS NOTES
PHYSICAL THERAPY - DAILY TREATMENT NOTE    Patient Name: Jamal Noguera        Date: 2017  : 1955   YES Patient  Verified  Visit #:   3   of   12  Insurance: Payor: BLUE CROSS MEDICARE / Plan: [facility] / Product Type: Managed Care Medicare /      In time: 225 Out time: 305   Total Treatment Time: 40     Medicare Time Tracking (below)   Total Timed Codes (min):  40 1:1 Treatment Time:  40     TREATMENT AREA = Right shoulder pain [M25.511]    SUBJECTIVE  Pain Level (on 0 to 10 scale):  5  / 10   Medication Changes/New allergies or changes in medical history, any new surgeries or procedures? NO    If yes, update Summary List   Subjective Functional Status/Changes:  []  No changes reported     Reports soreness Saturday and .  States today shoulder in still sore          OBJECTIVE  Modalities Rationale:      to improve patient's ability to    min [] Estim, type/location:                                      []  att     []  unatt     []  w/US     []  w/ice    []  w/heat    min []  Mechanical Traction: type/lbs                   []  pro   []  sup   []  int   []  cont    []  before manual    []  after manual    min []  Ultrasound, settings/location:      min []  Iontophoresis w/ dexamethasone, location:                                               []  take home patch       []  in clinic    min []  Ice     []  Heat    location/position:     min []  Vasopneumatic Device, press/temp:     min []  Other:    [] Skin assessment post-treatment (if applicable):    []  intact    []  redness- no adverse reaction     []redness  adverse reaction:        30 min Therapeutic Exercise:  [x]  See flow sheet   Rationale:      increase ROM and increase strength to improve the patients ability to perform ADLs     10 min Manual Therapy: DTM to (R) post cuff, teres major, pec minor, PROM for ER/IR of (R) shld   Rationale:      decrease pain, increase ROM and increase tissue extensibility to improve patient's ability to perform ADLs     min Patient Education:  YES  Reviewed HEP   []  Progressed/Changed HEP based on: Other Objective/Functional Measures:    Demonstrates mild hypertonicity in anterior vs posterior cuff/scap stab muscles with limited IR of (R) shoulder noted at onset of visit      Post Treatment Pain Level (on 0 to 10) scale:   4  / 10     ASSESSMENT  Assessment/Changes in Function:     Demonstrates decrease in overall s/s post treatment strategy. Advised to con't with HEP at home for self management of s/s     []  See Progress Note/Recertification   Patient will continue to benefit from skilled PT services to modify and progress therapeutic interventions, address functional mobility deficits, address ROM deficits, address strength deficits and analyze and address soft tissue restrictions to attain remaining goals.    Progress toward goals / Updated goals:    No progress towards goals this visit      PLAN  []  Upgrade activities as tolerated YES Continue plan of care   []  Discharge due to :    []  Other:      Therapist: Deyanira Dai DPT, CIMT    Date: 8/8/2017 Time: 3:04 PM       Future Appointments  Date Time Provider Lena Ashford   8/9/2017 2:30 PM Angeles Liao, PT [facility]   8/14/2017 5:30 PM Angeles Liao, PT [facility]   8/16/2017 9:00 AM Angeles Liao, PT [facility]   8/18/2017 2:30 PM Jose F Arteaga, PT [facility]   8/21/2017 2:30 PM Angeles Liao PT [facility]   8/22/2017 2:30 PM Jose F Arteaga, PT [facility]   8/25/2017 2:30 PM Jose F Arteaga PT [facility]   8/28/2017 2:30 PM Angeles Liao, PT [facility]   8/30/2017 2:30 PM Aline Ch [address] [place] [address]   9/1/2017 2:30 PM Jose F Arteaga PT [facility]   9/5/2017 2:30 PM Jose F Arteaga PT [facility]   9/6/2017 3:00 PM Angeles Liao, PT [facility]   9/8/2017 2:30 PM Jose F Arteaga, PT [facility]   9/11/2017 2:30 PM Aline Ch [address] [place] [address]   9/13/2017 3:00 PM Merlene VERA Jorge Ayala [facility]   9/15/2017 2:30 PM Reyes Gimenez, PT [facility]

## 2017-08-09 ENCOUNTER — HOSPITAL ENCOUNTER (OUTPATIENT)
Dept: PHYSICAL THERAPY | Age: 62
Discharge: HOME | End: 2017-08-09
Payer: MEDICARE

## 2017-08-09 PROCEDURE — 97140 MANUAL THERAPY 1/> REGIONS: CPT

## 2017-08-09 PROCEDURE — 97110 THERAPEUTIC EXERCISES: CPT

## 2017-08-09 NOTE — PROGRESS NOTES
PHYSICAL THERAPY - DAILY TREATMENT NOTE    Patient Name: Yakov Noguera        Date: 2017  : 1955   yes Patient  Verified  Visit #:     Insurance: Payor: BLUE CROSS MEDICARE / Plan: [facility] / Product Type: Managed Care Medicare /      In time: 2:30 Out time: 3:01   Total Treatment Time: 31     Medicare Time Tracking (below)   Total Timed Codes (min):  31 1:1 Treatment Time:  31     TREATMENT AREA =  Right shoulder pain [M25.511]    SUBJECTIVE  Pain Level (on 0 to 10 scale):  5  / 10   Medication Changes/New allergies or changes in medical history, any new surgeries or procedures?    no  If yes, update Summary List   Subjective Functional Status/Changes:  []  No changes reported     Pt reports she was sore after her PT session yesterday, but feels better today. Pt denies falls or red flags. Pt reports compliance with HEP. Max pain level over past week 8/10 (increases with rain)           23 min Therapeutic Exercise:  [x]  See flow sheet   Rationale:      increase ROM and increase strength to improve the patients ability to complete ADLs. 8 min Manual Therapy: STM to R post cuff in supine. R shoulder PROM WFL. Rationale:      decrease pain to improve patient's ability to complete ADLs. N/A min Patient Education:  yes  Reviewed HEP   []  Progressed/Changed HEP based on: Other Objective/Functional Measures:    Did not progress therapeutic ex due to back to back PT sessions     Post Treatment Pain Level (on 0 to 10) scale:    10     ASSESSMENT  Assessment/Changes in Function:     Pt denied sharp pains or red flags with therapeutic ex. Pt reported min improvement in pain/sxs at end of session.    []  See Progress Note/Recertification   Patient will continue to benefit from skilled PT services to modify and progress therapeutic interventions, address functional mobility deficits, address ROM deficits, address strength deficits, analyze and address soft tissue restrictions and analyze and cue movement patterns to attain remaining goals. Progress toward goals / Updated goals:    Pt reports compliance with HEP - progressing towards STG #1. Pt has not met STG #2 - max of 8/10 over past week.      PLAN  [x]  Upgrade activities as tolerated yes Continue plan of care   []  Discharge due to :    []  Other:      Therapist: Gaviota Julian PT    Date: 8/9/2017 Time: 2:35 PM     Future Appointments  Date Time Provider Lena Ashford   8/14/2017 5:30 PM Gaviota Julian, PT [facility]   8/16/2017 9:00 AM Gaviota Julian, PT [facility]   8/18/2017 2:30 PM Teddy Zuniga, PT [facility]   8/21/2017 2:30 PM Gaviota Julian, PT [facility]   8/22/2017 2:30 PM Teddy Zuniga, PT [facility]   8/25/2017 2:30 PM Teddy Zuniga, PT [facility]   8/28/2017 2:30 PM Gaviota Julian, PT [facility]   8/30/2017 2:30 PM Gaviota Julian, PT [facility]   9/1/2017 2:30 PM Teddy Zuniga, PT [facility]   9/5/2017 2:30 PM Teddy Zuniga, PT [facility]   9/6/2017 3:00 PM Gaviota Julian, PT [facility]   9/8/2017 2:30 PM Teddy Zuniga, PT [facility]   9/11/2017 2:30 PM Gaviota Julian, PT [facility]   9/13/2017 3:00 PM Gaviota Julian, PT [facility]   9/15/2017 2:30 PM Teddy Zuniga, PT [facility]

## 2017-08-14 ENCOUNTER — HOSPITAL ENCOUNTER (OUTPATIENT)
Dept: PHYSICAL THERAPY | Age: 62
Discharge: HOME | End: 2017-08-14
Payer: MEDICARE

## 2017-08-14 PROCEDURE — 97110 THERAPEUTIC EXERCISES: CPT

## 2017-08-14 PROCEDURE — 97140 MANUAL THERAPY 1/> REGIONS: CPT

## 2017-08-14 NOTE — PROGRESS NOTES
PHYSICAL THERAPY - DAILY TREATMENT NOTE    Patient Name: Rosa Noguera        Date: 2017  : 1955   yes Patient  Verified  Visit #:     Insurance: Payor: BLUE CROSS MEDICARE / Plan: [facility] / Product Type: Managed Care Medicare /      In time: 5:30 Out time: 6:09   Total Treatment Time: 39     Medicare Time Tracking (below)   Total Timed Codes (min):  39 1:1 Treatment Time:  29     TREATMENT AREA =  Right shoulder pain [M25.511]    SUBJECTIVE  Pain Level (on 0 to 10 scale):  5 / 10   Medication Changes/New allergies or changes in medical history, any new surgeries or procedures?    no  If yes, update Summary List   Subjective Functional Status/Changes:  []  No changes reported     Pt reports the rain over the weekend made all of her joints hurt. Pt denies falls or red flags. Pt reports inconsistency with HEP. 31 min Therapeutic Exercise:  [x]  See flow sheet (Billed 21 minutes)   Rationale:      increase ROM and increase strength to improve the patients ability to complete ADLs. 8 min Manual Therapy: STM to R post cuff in supine. R shoulder PROM WFL. (Billed 8 minutes)   Rationale:      decrease pain to improve patient's ability to complete ADLs. N/A min Patient Education:  yes  Reviewed HEP   []  Progressed/Changed HEP based on:  Reviewed importance of compliance with HEP. Pt reports understanding. Other Objective/Functional Measures:    Increased reps to improve thoracic mobility and posture     Post Treatment Pain Level (on 0 to 10) scale:   3  / 10     ASSESSMENT  Assessment/Changes in Function:     Pt denied sharp pains or red flags with therapeutic ex. Pt reported an improvement in pain/sxs at end of session.    []  See Progress Note/Recertification   Patient will continue to benefit from skilled PT services to modify and progress therapeutic interventions, address functional mobility deficits, address ROM deficits, address strength deficits, analyze and address soft tissue restrictions and analyze and cue movement patterns to attain remaining goals. Progress toward goals / Updated goals:    No significant change in progress towards goals - inconsistency with HEP     PLAN  [x]  Upgrade activities as tolerated yes Continue plan of care   []  Discharge due to :    [x]  Other: Reassess next session.      Therapist: Vickie Marvin PT    Date: 8/14/2017 Time: 5:48 PM     Future Appointments  Date Time Provider Lena Makeda   8/16/2017 9:00 AM [address] [place] [address]   8/18/2017 2:30 PM Texie Holstein, PT [facility]   8/21/2017 2:30 PM Vickie Marvin, PT [facility]   8/22/2017 2:30 PM Texie Holstein, PT [facility]   8/25/2017 2:30 PM Texfatou Holstein, PT [facility]   8/28/2017 2:30 PM Vickie Marvin, PT [facility]   8/30/2017 2:30 PM [place] Shavonne, PT [facility]   9/1/2017 2:30 PM Texie Holstein, PT [facility]   9/5/2017 2:30 PM Texie Holstein, PT [facility]   9/6/2017 3:00 PM Vickie Shavonne, PT [facility]   9/8/2017 2:30 PM Texie Holstein, PT [facility]   9/11/2017 2:30 PM Vickie Shavonne, PT [facility]   9/13/2017 3:00 PM [place] Shavonne, PT [facility]   9/15/2017 2:30 PM Texie Holstein, PT [facility]

## 2017-08-16 ENCOUNTER — APPOINTMENT (OUTPATIENT)
Dept: PHYSICAL THERAPY | Age: 62
End: 2017-08-16
Payer: MEDICARE

## 2017-08-18 ENCOUNTER — HOSPITAL ENCOUNTER (OUTPATIENT)
Dept: PHYSICAL THERAPY | Age: 62
Discharge: HOME | End: 2017-08-18
Payer: MEDICARE

## 2017-08-18 PROCEDURE — 97140 MANUAL THERAPY 1/> REGIONS: CPT

## 2017-08-18 PROCEDURE — 97110 THERAPEUTIC EXERCISES: CPT

## 2017-08-18 NOTE — PROGRESS NOTES
[facility] PHYSICAL THERAPY  [address] [place] [place] [address], [place], [address] - Phone: [phone]  Fax: [phone] OF CARE/RECERTIFICATION FOR PHYSICAL THERAPY          Patient Name: Nandini Noguera : 1955   Medical/Treatment Diagnosis: Right shoulder pain [M25.511]   Onset Date: chronic    Referral Source: Meng Corrigan DO Start of Care [facility]): 17   Prior Hospitalization: See Medical History Provider #: [id number]   Prior Level of Function: Limited with positional tolerance, lifting carrying items due to chronic shld pain and cervical pain   Comorbidities: OA   Medications: Verified on Patient Summary List   Visits from [facility]: 6 Missed Visits:      Goal/Measure of Progress Goal Met?   1.  1. Pt will be independent and compliant with HEP to decrease pain, increase ROM and return pt to PLOF. Status at last Eval: dependent Current Status: independent yes   2.  2. Pt will note pain less than or equal to 5/10 at worst to allow increase in functional abilities.     Status at last Eval: 6-9/10 Current Status: 3-5/10 yes   3.  3. Pt will demonstrates increase in (R) shld AROM flexion to 160 deg to aid in performing ADLs    Status at last Eval: (R) AROM 145 deg Current Status: (R) AROM 165 deg yes     Key Functional Changes/Progress: Pt con't with intermittent pain in (R) shld and cervical region since onset of PT, however notes decrease in overall pain levels and increase in functional use of (R) UE, FOTO score has increase to 51 pts, GROC 6+  Problem List: pain affecting function, decrease ROM, decrease ADL/ functional abilitiies, decrease activity tolerance and decrease flexibility/ joint mobility   Treatment Plan may include any combination of the following: Therapeutic exercise, Therapeutic activities, Neuromuscular re-education, Physical agent/modality, Manual therapy and Patient education  Patient Goal(s) has been updated and includes:  Goals for this certification period include and are to be achieved in   3-4  weeks:  1. Increase score on FOTO by > or = 21 points to demo increase in functional activities. 2. Pt will have full, pain-free AROM of the cervical spine in all planes to increase safety with driving, ADLs and self-care. 3. Pt will note < or = 2/10 pain with all mobility to improve comfort with ADLs. Frequency / Duration:   Patient to be seen   2-3   times per week for   4    weeks:  G-Codes (GP): Carry [id number] Current  CK= 40-59%    Goal  CJ= 20-39%. The severity rating is based on the FOTO Score    Assessments/Recommendations: Con't PT services 2-3x week for 4 week  If you have any questions/comments please contact us directly at [phone]. Thank you for allowing us to assist in the care of your patient. Therapist Signature: Yvette Grant DPT, CIMT Date: 5/02/7644   Certification Period:  Reporting Period: 8/17/17-10/24/17  8/1/17-8/21/17 Time: 3:11 PM   NOTE TO PHYSICIAN:  PLEASE COMPLETE THE ORDERS BELOW AND FAX TO   [facility]: [phone]  If you are unable to process this request in 24 hours please contact our office: [phone]    ___ I have read the above report and request that my patient continue as recommended.   ___ I have read the above report and request that my patient continue therapy with the following changes/special instructions: ________________________________________________   ___ I have read the above report and request that my patient be discharged from therapy.      Physician Signature:        Date:       Time:

## 2017-08-18 NOTE — PROGRESS NOTES
PHYSICAL THERAPY - DAILY TREATMENT NOTE    Patient Name: Nadiya Noguera        Date: 2017  : 1955   YES Patient  Verified  Visit #:     Insurance: Payor: BLUE CROSS MEDICARE / Plan: [facility] / Product Type: Managed Care Medicare /      In time: 230 Out time: 310   Total Treatment Time: 40     Medicare Time Tracking (below)   Total Timed Codes (min):  40 1:1 Treatment Time:  40     TREATMENT AREA = Right shoulder pain [M25.511]    SUBJECTIVE  Pain Level (on 0 to 10 scale):  5  / 10   Medication Changes/New allergies or changes in medical history, any new surgeries or procedures? NO    If yes, update Summary List   Subjective Functional Status/Changes:  []  No changes reported     Reports soreness in shld after last visit          OBJECTIVE    [] Skin assessment post-treatment (if applicable):    []  intact    []  redness- no adverse reaction     []redness  adverse reaction:        30 min Therapeutic Exercise:  [x]  See flow sheet   Rationale:      increase ROM and increase strength to improve the patients ability to perform ADLs      10 Min Manual Therapy: DTM to post cuff, UT/LS, pec minor, TA mobs for extension, MET for ERSL T1-T2, anterior mobs for [facility] joint    Rationale:      decrease pain, increase ROM and increase tissue extensibility to improve patient's ability to perform ADLs      min Patient Education:  YES  Reviewed HEP   []  Progressed/Changed HEP based on:         Other Objective/Functional Measures:    Demonstrates soreness in post cuff, UT/Ls region , decrease in shld flexion and IR of (R) shld, decrease AC anterior rotation      Post Treatment Pain Level (on 0 to 10) scale:   0  / 10     ASSESSMENT  Assessment/Changes in Function:     Reports no pain after therapy session, demonstrates improved IR and flexion of shld without increase in s/s     []  See Progress Note/Recertification   Patient will continue to benefit from skilled PT services to modify and progress therapeutic interventions, address functional mobility deficits, address ROM deficits, address strength deficits and analyze and address soft tissue restrictions to attain remaining goals.    Progress toward goals / Updated goals:    Progressing with towards LTGs, see progress note     PLAN  []  Upgrade activities as tolerated YES Continue plan of care   []  Discharge due to :    []  Other:      Therapist: Stacy Valverde DPT, CIMT    Date: 8/18/2017 Time: 3:04 PM       Future Appointments  Date Time Provider Lena Ashford   8/21/2017 2:30 PM Sandra Radford, PT [facility]   8/22/2017 2:30 PM Benson Murray, PT [facility]   8/25/2017 2:30 PM Benson Murray, PT [facility]   8/28/2017 2:30 PM Sandra Radford, PT [facility]   8/30/2017 2:30 PM Sandra Radford, PT [facility]   9/5/2017 2:30 PM Benson Murray, PT [facility]   9/6/2017 3:00 PM Sandra Radford, PT [facility]   9/11/2017 2:30 PM Sandra Radford, PT [facility]   9/13/2017 3:00 PM Sandra Radford, PT [facility]   9/15/2017 2:30 PM Benson Murray, PT [facility]

## 2017-08-21 ENCOUNTER — HOSPITAL ENCOUNTER (OUTPATIENT)
Dept: PHYSICAL THERAPY | Age: 62
End: 2017-08-21
Payer: MEDICARE

## 2017-08-22 ENCOUNTER — APPOINTMENT (OUTPATIENT)
Dept: PHYSICAL THERAPY | Age: 62
End: 2017-08-22
Payer: MEDICARE

## 2017-08-28 ENCOUNTER — APPOINTMENT (OUTPATIENT)
Dept: PHYSICAL THERAPY | Age: 62
End: 2017-08-28
Payer: MEDICARE

## 2017-08-30 ENCOUNTER — HOSPITAL ENCOUNTER (OUTPATIENT)
Dept: PHYSICAL THERAPY | Age: 62
Discharge: HOME | End: 2017-08-30
Payer: MEDICARE

## 2017-08-30 PROCEDURE — 97140 MANUAL THERAPY 1/> REGIONS: CPT

## 2017-08-30 PROCEDURE — 97110 THERAPEUTIC EXERCISES: CPT

## 2017-08-30 NOTE — PROGRESS NOTES
PHYSICAL THERAPY - DAILY TREATMENT NOTE    Patient Name: Nadiya Noguera        Date: 2017  : 1955   yes Patient  Verified  Visit #:     Insurance: Payor: BLUE CROSS MEDICARE / Plan: [facility] / Product Type: Managed Care Medicare /      In time: 8:58 Out time: 9:41   Total Treatment Time: 43     Medicare Time Tracking (below)   Total Timed Codes (min):  43 1:1 Treatment Time:  32     TREATMENT AREA =  Right shoulder pain [M25.511]    SUBJECTIVE  Pain Level (on 0 to 10 scale):  8  10   Medication Changes/New allergies or changes in medical history, any new surgeries or procedures?    no  If yes, update Summary List   Subjective Functional Status/Changes:  []  No changes reported   Pt reports increased pain R UT since last PT session. Pt denies falls or red flags. Pt reports compliance with HEP. 31 min Therapeutic Exercise:  [x]  See flow sheet (Billed 20 minutes)   Rationale:      increase ROM and increase strength to improve the patients ability to complete ADLs. 12 min Manual Therapy: STM to R UT, levator scap in supine. (Billed 12 minutes)   Rationale:      decrease pain to improve patient's ability to complete ADLs. N/A min Patient Education:  yes  Reviewed HEP   []  Progressed/Changed HEP based on:  Reviewed importance of compliance with HEP. Reviewed importance of posture. Pt reports understanding. Other Objective/Functional Measures: Moderated muscle spasm in R UT and levator scap today     Post Treatment Pain Level (on 0 to 10) scale:   0  / 10     ASSESSMENT  Assessment/Changes in Function:     Pt denied sharp pains or red flags with therapeutic ex. Pt reported significant improvement at end of session with 0/10 pain levels.    []  See Progress Note/Recertification   Patient will continue to benefit from skilled PT services to modify and progress therapeutic interventions, address functional mobility deficits, address ROM deficits, address strength deficits, analyze and address soft tissue restrictions and analyze and cue movement patterns to attain remaining goals.    Progress toward goals / Updated goals:    No significant progress towards goals this session - elevated pain levels prior today's PT session     PLAN  [x]  Upgrade activities as tolerated yes Continue plan of care   []  Discharge due to :    []  Other:      Therapist: Grace Wade PT    Date: 8/30/2017 Time: 9:40 AM     Future Appointments  Date Time Provider Lena Ashford   9/5/2017 2:30 PM Reyes Gimenez, PT [facility]   9/6/2017 3:00 PM Grace Wade, PT [facility]   9/11/2017 2:30 PM Grace Wade PT [facility]   9/13/2017 3:00 PM Grace Wade, PT [facility]   9/15/2017 2:30 PM Reyes Gimenez, PT [facility]

## 2017-09-01 ENCOUNTER — APPOINTMENT (OUTPATIENT)
Dept: PHYSICAL THERAPY | Age: 62
End: 2017-09-01
Payer: MEDICARE

## 2017-09-05 ENCOUNTER — HOSPITAL ENCOUNTER (OUTPATIENT)
Dept: PHYSICAL THERAPY | Age: 62
Discharge: HOME | End: 2017-09-05
Payer: MEDICARE

## 2017-09-05 PROCEDURE — 97110 THERAPEUTIC EXERCISES: CPT

## 2017-09-05 PROCEDURE — 97140 MANUAL THERAPY 1/> REGIONS: CPT

## 2017-09-05 NOTE — PROGRESS NOTES
PHYSICAL THERAPY - DAILY TREATMENT NOTE    Patient Name: Laxmi Noguera        Date: 2017  : 1955   YES Patient  Verified  Visit #:     Insurance: Payor: BLUE CROSS MEDICARE / Plan: [facility] CARE / Product Type: Managed Care Medicare /      In time: 225 Out time: 300   Total Treatment Time: 35     Medicare Time Tracking (below)   Total Timed Codes (min):  35 1:1 Treatment Time:  35     TREATMENT AREA = Right shoulder pain [M25.511]    SUBJECTIVE  Pain Level (on 0 to 10 scale):  5  / 10   Medication Changes/New allergies or changes in medical history, any new surgeries or procedures?     NO    If yes, update Summary List   Subjective Functional Status/Changes:  []  No changes reported     Reports con't stiffness in (R) shld region,\"When I sit inside with the [facility] [place], operated by [facility] and it blows on my shoulder, it causes it to hurt\"          OBJECTIVE  Modalities Rationale:      to improve patient's ability to    min [] Estim, type/location:                                      []  att     []  unatt     []  w/US     []  w/ice    []  w/heat    min []  Mechanical Traction: type/lbs                   []  pro   []  sup   []  int   []  cont    []  before manual    []  after manual    min []  Ultrasound, settings/location:      min []  Iontophoresis w/ dexamethasone, location:                                               []  take home patch       []  in clinic    min []  Ice     []  Heat    location/position:     min []  Vasopneumatic Device, press/temp:     min []  Other:    [] Skin assessment post-treatment (if applicable):    []  intact    []  redness- no adverse reaction     []redness  adverse reaction:        25 min Therapeutic Exercise:  [x]  See flow sheet   Rationale:      increase ROM and increase strength to improve the patients ability to perform ADLs     10 Min Manual Therapy: DTM to (R) UT/Ls, post cuff, pec minor, PROM of (R) shld in all planes    Rationale: decrease pain, increase ROM, increase tissue extensibility and decrease trigger points to improve patient's ability to increase functional use of (R) UE     Rationale:       min Patient Education:  YES  Reviewed HEP   []  Progressed/Changed HEP based on: Other Objective/Functional Measures:    Con't with increased hypertonicity with TPs in (R) UT/Ls region limited shld flexion to approx 160 deg at onset of visit, mild reports of end range pain with flexion activities      Post Treatment Pain Level (on 0 to 10) scale:   0  / 10     ASSESSMENT  Assessment/Changes in Function:     Progressing with slowly with overall functional activity tolerance, advised patient to move away from sources of AC and in use of heat prn for management of (R) shld s/s     []  See Progress Note/Recertification   Patient will continue to benefit from skilled PT services to modify and progress therapeutic interventions, address functional mobility deficits, address ROM deficits, address strength deficits, analyze and address soft tissue restrictions and analyze and cue movement patterns to attain remaining goals. Progress toward goals / Updated goals:     Will monitor and progress as able, no changes towards LTGS     PLAN  []  Upgrade activities as tolerated YES Continue plan of care   []  Discharge due to :    []  Other:      Therapist: Morenita Kirby DPT, [facility]    Date: 9/5/2017 Time: 2:58 PM       Future Appointments  Date Time Provider Lena Ashford   9/6/2017 3:00 PM Zaynab Reddy PT [facility]   9/11/2017 2:30 PM Zaynab Reddy PT [facility]   9/13/2017 3:00 PM Zaynab Reddy PT [facility]   9/15/2017 2:30 PM Yessy Pardo PT [facility]

## 2017-09-06 ENCOUNTER — HOSPITAL ENCOUNTER (OUTPATIENT)
Dept: PHYSICAL THERAPY | Age: 62
Discharge: HOME | End: 2017-09-06
Payer: MEDICARE

## 2017-09-06 PROCEDURE — 97140 MANUAL THERAPY 1/> REGIONS: CPT

## 2017-09-06 PROCEDURE — 97110 THERAPEUTIC EXERCISES: CPT

## 2017-09-06 NOTE — PROGRESS NOTES
PHYSICAL THERAPY - DAILY TREATMENT NOTE    Patient Name: Phill Noguera        Date: 2017  : 1955   yes Patient  Verified  Visit #:     Insurance: Payor: BLUE CROSS MEDICARE / Plan: [facility] / Product Type: Managed Care Medicare /      In time: 2:58 Out time: 3:50   Total Treatment Time: 52     Medicare Time Tracking (below)   Total Timed Codes (min):  42 1:1 Treatment Time:  42     TREATMENT AREA =  Right shoulder pain [M25.511]    SUBJECTIVE  Pain Level (on 0 to 10 scale):  7 / 10   Medication Changes/New allergies or changes in medical history, any new surgeries or procedures?    no  If yes, update Summary List   Subjective Functional Status/Changes:  []  No changes reported   Pt reports stiffness/pain in her shoulder and neck today. Pt denies falls or red flags. Pt reports compliance with HEP. Pt reports increased pain levels with rainy/poor weather, and repetitive chopping of food/cooking. OBJECTIVE  Modalities Rationale:     decrease inflammation and decrease pain to improve patient's ability to complete ADLs.    min [] Estim, type/location:                                      []  att     []  unatt     []  w/US     []  w/ice    []  w/heat    min []  Mechanical Traction: type/lbs                   []  pro   []  sup   []  int   []  cont    []  before manual    []  after manual    min []  Ultrasound, settings/location:      min []  Iontophoresis w/ dexamethasone, location:                                               []  take home patch       []  in clinic   10 min [x]  Ice     []  Heat    location/position: Supine to c/s and R shoulder    min []  Vasopneumatic Device, press/temp:     min []  Other:    [x] Skin assessment post-treatment (if applicable):    [x]  intact    [x]  no adverse reaction     []redness  adverse reaction:        30 min Therapeutic Exercise:  [x]  See flow sheet   Rationale:      increase ROM and increase strength to improve the patients ability to complete ADLs. 12 min Manual Therapy: STM to R UT, levator scap, LH biceps and ant deltoid in supine. Rationale:      decrease pain to improve patient's ability to complete ADLs. N/A min Patient Education:  yes  Reviewed HEP   []  Progressed/Changed HEP based on:  Reviewed importance of compliance with HEP. Pt instructed to ice prn 10-15 minutes on/ 1 hour off. Reviewed importance of posture. Pt reports understanding. Other Objective/Functional Measures:    Pt continues to demonstrate increased muscle spasm in R UT and levator scap - pt reports this is due to chopping food/cooking    Added: ball on wall ABC (below shoulder level) to improve scapular stability   Post Treatment Pain Level (on 0 to 10) scale:   5  / 10     ASSESSMENT  Assessment/Changes in Function:     Pt denied sharp pains or red flags with therapeutic ex. Pt reported an improvement in pain/sxs at end of session. []  See Progress Note/Recertification   Patient will continue to benefit from skilled PT services to modify and progress therapeutic interventions, address functional mobility deficits, address ROM deficits, address strength deficits, analyze and address soft tissue restrictions and analyze and cue movement patterns to attain remaining goals. Progress toward goals / Updated goals:    Pt continues to report high pain levels which pt reports is mostly due to OA which flares up with poor weather (today it is raining/storming). Plan to re-assess next session for discharge to [facility].      PLAN  [x]  Upgrade activities as tolerated yes Continue plan of care   []  Discharge due to :    [x]  Other: Reassess for discharge to [facility] next session     Therapist: Rishi White PT    Date: 9/6/2017 Time: 9:40 AM     Future Appointments  Date Time Provider Lena Ashford   9/11/2017 2:30 PM Rishi White PT [facility]   9/13/2017 3:00 PM Rishi White PT [facility]   9/15/2017 2:30 PM Esther Norman Sebastian FOWLER [facility]

## 2017-09-08 ENCOUNTER — APPOINTMENT (OUTPATIENT)
Dept: PHYSICAL THERAPY | Age: 62
End: 2017-09-08
Payer: MEDICARE

## 2017-09-11 ENCOUNTER — HOSPITAL ENCOUNTER (OUTPATIENT)
Dept: PHYSICAL THERAPY | Age: 62
Discharge: HOME | End: 2017-09-11
Payer: MEDICARE

## 2017-09-11 PROCEDURE — G8986 CARRY D/C STATUS: HCPCS

## 2017-09-11 PROCEDURE — 97110 THERAPEUTIC EXERCISES: CPT

## 2017-09-11 PROCEDURE — 97140 MANUAL THERAPY 1/> REGIONS: CPT

## 2017-09-11 PROCEDURE — G8985 CARRY GOAL STATUS: HCPCS

## 2017-09-11 NOTE — PROGRESS NOTES
PHYSICAL THERAPY - DAILY TREATMENT NOTE    Patient Name: Yakov Noguera        Date: 2017  : 1955   yes Patient  Verified  Visit #:   10  of   12  Insurance: Payor: BLUE CROSS MEDICARE / [facility] / Product Type: Managed Care Medicare /      In time: 2:30 Out time: 3:13   Total Treatment Time: 43     Medicare Time Tracking (below)   Total Timed Codes (min):  43 1:1 Treatment Time:  30     TREATMENT AREA =  Right shoulder pain [M25.511]    SUBJECTIVE  Pain Level (on 0 to 10 scale):  4 / 10   Medication Changes/New allergies or changes in medical history, any new surgeries or procedures?    no  If yes, update Summary List   Subjective Functional Status/Changes:  []  No changes reported   Pt reports an improvement in pain/sxs since last session. Pain levels over last week range 3-7/10, GROC: +6, FOTO Score: 66/100. Pt reports compliance with HEP. Pt denies falls or red flags. Pt reports she is ready for discharge from PT after today's session.        OBJECTIVE  Modalities Rationale:     N/A to improve patient's ability to complete N/A - pt declined   min [] Estim, type/location:                                      []  att     []  unatt     []  w/US     []  w/ice    []  w/heat    min []  Mechanical Traction: type/lbs                   []  pro   []  sup   []  int   []  cont    []  before manual    []  after manual    min []  Ultrasound, settings/location:      min []  Iontophoresis w/ dexamethasone, location:                                               []  take home patch       []  in clinic    min []  Ice     []  Heat    location/position:     min []  Vasopneumatic Device, press/temp:     min []  Other:    [] Skin assessment post-treatment (if applicable):    []  intact    []  no adverse reaction     []redness  adverse reaction:        33 min Therapeutic Exercise:  [x]  See flow sheet (Billed 20 minutes)   Rationale:      increase ROM and increase strength to improve the patients ability to complete ADLs. 10 min Manual Therapy: STM to R UT, levator scap in supine. (Billed 10 minutes)   Rationale:      decrease pain to improve patient's ability to complete ADLs. N/A min Patient Education:  yes  Reviewed HEP   []  Progressed/Changed HEP based on:  Pt instructed to continue with HEP 3x/week after discharge from PT. Pt instructed to ice prn 10-15 minutes on/ 1 hour off. Reviewed importance of posture. Pt reports understanding. Other Objective/Functional Measures:    *c/s AROM: 58 R Rot, 60 L Rot, 14 R lat flex, 13 L lat flex, 21 ext, 36 flex  *Seated R shoulder AROM: 142 scaption, 157 flex, 134 ABD  *WFL R shoulder supine PROM    *Added fingerladder flex and scaption to improve shoulder AAROM/AROM    Post Treatment Pain Level (on 0 to 10) scale:   0  / 10     ASSESSMENT  Assessment/Changes in Function:     Pt denied sharp pains or red flags with therapeutic ex. Pt denied pain at end of session. See discharge note. []  See Progress Note/Recertification   Patient will continue to benefit from skilled PT services to see discharge note. Progress toward goals / Updated goals:    See discharge note.      PLAN  []  Upgrade activities as tolerated no Continue plan of care   [x]  Discharge due to : Meeting/progressing towards goals and per pt request.   []  Other:      Therapist: Zaynab Reddy PT    Date: 9/11/2017 Time: 3:00 PM     Future Appointments  Date Time Provider Lena Ashford   9/13/2017 3:00 PM Zaynab Reddy PT [facility]   9/15/2017 2:30 PM Yessy Pardo PT [facility]

## 2017-09-11 NOTE — PROGRESS NOTES
[address] [place] [address] PHYSICAL THERAPY  [address], [place] [address],[place], [address] [facility] - Phone: [phone]  Fax: [phone] FOR PHYSICAL THERAPY          Patient Name: Honey Noguera : 1955   Treatment/Medical Diagnosis: Right shoulder pain [M25.511], Cervical pain, Bursitis of R shoulder   Onset Date: Chronic    Referral Source: Dottie Blas DO Start of Care [facility]): 17   Prior Hospitalization: See Medical History Provider #: [id number]   Prior Level of Function: Limited with positional tolerance, lifting, carrying items due to chronic shoulder pain and cervical pain   Comorbidities: OA   Medications: Verified on Patient Summary List   Visits from [facility]: 10 Missed Visits: 2     Goal/Measure of Progress Goal Met? 1. Increase score on FOTO by > or = 21 points to demo increase in functional activities. Status at last Eval: 41/100 initial eval Current Status: 66/100 yes   2. Pt will have full, pain-free AROM of the cervical spine in all planes to increase safety with driving, ADLs and self-care. Status at last Eval: 40 flex, 40 ext, 15 B lat flex Current Status: 58 R Rot, 60 L Rot, 14 R lat flex, 13 L lat flex, 21 ext, 36 flex no   3. Pt will note </= 2/10 pain with all mobility to improve comfort with ADLs. Status at last Eval: 6-9/10 initial eval,  3-5/10 last assessment Current Status: 3-7/10 No     Key Functional Changes/Progress: Pt demonstrates fair progress with PT since initial eval. Pt's pain levels vary depending on weather changes and activity levels outside of PT (increase with poor weather/rain and repetitive chopping/cooking of food). Pt reports pain levels range 3-7/10. Pt reports significant improvements in functional mobility with 66/100 FOTO scores and +6 on GROC indicating \"A great deal better\". Pt's current c/s AROM is listed above and current R shoulder seated AROM is: 142 scaption, 157 flex, and 134 ABD.  Pt reports she is ready for discharge from PT to [facility]. G-Codes (GP): Carry  [id number] Goal  CJ= 20-39%  [id number] D/C  CJ= 20-39%. The severity rating is based on the FOTO Score    Assessments/Recommendations: Patient discharged from PT secondary to meeting/progressing towards goals and per pt request. Thank you for this referral.    If you have any questions/comments please contact us directly at [phone]. Thank you for allowing us to assist in the care of your patient.     Therapist Signature: Rishi White PT Date: 9/11/17   Reporting Period: 8/1/17 to 9/11/17 Time: 5:33 PM

## 2017-09-13 ENCOUNTER — APPOINTMENT (OUTPATIENT)
Dept: PHYSICAL THERAPY | Age: 62
End: 2017-09-13
Payer: MEDICARE

## 2017-09-15 ENCOUNTER — APPOINTMENT (OUTPATIENT)
Dept: PHYSICAL THERAPY | Age: 62
End: 2017-09-15
Payer: MEDICARE